# Patient Record
Sex: MALE | NOT HISPANIC OR LATINO | URBAN - METROPOLITAN AREA
[De-identification: names, ages, dates, MRNs, and addresses within clinical notes are randomized per-mention and may not be internally consistent; named-entity substitution may affect disease eponyms.]

---

## 2024-02-15 ENCOUNTER — TELEPHONE (OUTPATIENT)
Dept: TRANSPLANT | Facility: CLINIC | Age: 69
End: 2024-02-15

## 2024-02-15 NOTE — TELEPHONE ENCOUNTER
----- Message from Jie Fidel sent at 2/15/2024  3:14 PM CST -----  Regarding: Appt  Contact: Keyana  [phone]          Name of Caller: Keyana French     Contact Preference:  [phone]     Nature of Call:   Called to check on the status of referral  sent over a month ago on pt's behalf      No

## 2024-02-15 NOTE — TELEPHONE ENCOUNTER
Returned call. Spoke with [facility], no referral was found in our EFAX or other routes of receiving referrals(hepatology, clinic , etc.) They will refax

## 2024-02-16 ENCOUNTER — TELEPHONE (OUTPATIENT)
Dept: TRANSPLANT | Facility: CLINIC | Age: 69
End: 2024-02-16
Payer: MEDICARE

## 2024-02-16 NOTE — TELEPHONE ENCOUNTER
Pt records reviewed.  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 Dandre Watt   Referral letter sent to patient.      RECORDS SCANNED IN MEDIA UNDER HEPATOLOGY REFERRAL .

## 2024-02-16 NOTE — LETTER
February 16, 2024    Zane LEAHY [id number]      Dear Zane Gleason:    Your doctor has referred you to the [facility]. We are sending this letter to remind you to make an appointment with us to complete the referral process.     Please call us at [phone] to schedule an appointment. We look forward to seeing you soon.     If you received a call and have been scheduled, please disregard this letter.       Sincerely,        [facility]   [facility] [place] [address]  [phone]

## 2024-02-20 NOTE — TELEPHONE ENCOUNTER
An appointment is schedule with Dr. Scottie Prakash on Monday, May 6, 2024 at 9:30AM ([facility]).  A copy of the appointment have been mailed out.